# Patient Record
Sex: MALE | Race: AMERICAN INDIAN OR ALASKA NATIVE
[De-identification: names, ages, dates, MRNs, and addresses within clinical notes are randomized per-mention and may not be internally consistent; named-entity substitution may affect disease eponyms.]

---

## 2017-03-02 ENCOUNTER — HOSPITAL ENCOUNTER (EMERGENCY)
Dept: HOSPITAL 5 - ED | Age: 62
Discharge: HOME | End: 2017-03-02
Payer: SELF-PAY

## 2017-03-02 VITALS — SYSTOLIC BLOOD PRESSURE: 171 MMHG | DIASTOLIC BLOOD PRESSURE: 94 MMHG

## 2017-03-02 DIAGNOSIS — R13.10: Primary | ICD-10-CM

## 2017-03-02 DIAGNOSIS — F17.200: ICD-10-CM

## 2017-03-02 DIAGNOSIS — I10: ICD-10-CM

## 2017-03-02 LAB
ALBUMIN SERPL-MCNC: 3.8 G/DL (ref 3.9–5)
ALBUMIN/GLOB SERPL: 0.8 %
ALP SERPL-CCNC: 99 UNITS/L (ref 35–129)
ALT SERPL-CCNC: 10 UNITS/L (ref 7–56)
ANION GAP SERPL CALC-SCNC: 16 MMOL/L
BASOPHILS NFR BLD AUTO: 0.4 % (ref 0–1.8)
BILIRUB SERPL-MCNC: 0.3 MG/DL (ref 0.1–1.2)
BUN SERPL-MCNC: 18 MG/DL (ref 9–20)
BUN/CREAT SERPL: 15 %
CALCIUM SERPL-MCNC: 9.5 MG/DL (ref 8.4–10.2)
CHLORIDE SERPL-SCNC: 99.2 MMOL/L (ref 98–107)
CO2 SERPL-SCNC: 28 MMOL/L (ref 22–30)
EOSINOPHIL NFR BLD AUTO: 1.4 % (ref 0–4.3)
GLUCOSE SERPL-MCNC: 79 MG/DL (ref 75–100)
HCT VFR BLD CALC: 41.3 % (ref 35.5–45.6)
HGB BLD-MCNC: 13.7 GM/DL (ref 11.8–15.2)
LIPASE SERPL-CCNC: 34 UNITS/L (ref 13–60)
MCH RBC QN AUTO: 29 PG (ref 28–32)
MCHC RBC AUTO-ENTMCNC: 33 % (ref 32–34)
MCV RBC AUTO: 88 FL (ref 84–94)
PLATELET # BLD: 232 K/MM3 (ref 140–440)
POTASSIUM SERPL-SCNC: 4.9 MMOL/L (ref 3.6–5)
PROT SERPL-MCNC: 8.3 G/DL (ref 6.3–8.2)
RBC # BLD AUTO: 4.71 M/MM3 (ref 3.65–5.03)
SODIUM SERPL-SCNC: 138 MMOL/L (ref 137–145)
WBC # BLD AUTO: 9 K/MM3 (ref 4.5–11)

## 2017-03-02 PROCEDURE — 99283 EMERGENCY DEPT VISIT LOW MDM: CPT

## 2017-03-02 PROCEDURE — 80053 COMPREHEN METABOLIC PANEL: CPT

## 2017-03-02 PROCEDURE — 36415 COLL VENOUS BLD VENIPUNCTURE: CPT

## 2017-03-02 PROCEDURE — 83690 ASSAY OF LIPASE: CPT

## 2017-03-02 PROCEDURE — 85025 COMPLETE CBC W/AUTO DIFF WBC: CPT

## 2017-03-02 NOTE — EMERGENCY DEPARTMENT REPORT
HPI





- General


Chief Complaint: Abdominal Pain


Time Seen by Provider: 03/02/17 11:43





- HPI


HPI: 





 


Chief complaint: Difficulty swallowing


HPI: Patient is a 61-year-old male with a history of GERD and hypertension who 

states for the last several weeks is unable to eat anything solid without 

chewing it into very small pieces.  Patient states that he doesn't do that it 

comes back up.  Patient states if he drinks too quickly even that woke him back 

up.  Patient denies any pain.  Patient is out of his blood pressure medicine 

and states he was going to [facility] today to have that refilled.  She 

denies any fever, chest pain, diarrhea, abdominal pain.


Mode of arrival:   [private car]


Source: [Patient]


Began: Several weeks ago


Duration: 2 weeks ago


Context: Patient has history of GERD


Quality: Pain-free


Severity: 0 out of 10 


Improved with: See above


Worsened with: See above


Associated signs and symptoms: See above





ED Past Medical Hx





- Past Medical History


Previous Medical History?: Yes


Hx Hypertension: Yes


Additional medical history: Angioedema





- Surgical History


Past Surgical History?: No





- Social History


Smoking Status: Current Every Day Smoker


Substance Use Type: None





- Medications


Home Medications: 


 Home Medications











 Medication  Instructions  Recorded  Confirmed  Last Taken  Type


 


Pantoprazole [Protonix] 40 mg PO QDAY #30 tablet 06/13/16  Unknown Rx


 


Hydrochlorothiazide [HCTZ] 25 mg PO QDAY #30 tablet 03/02/17  Unknown Rx














ED Review of Systems


ROS: 


Stated complaint: ABD PAIN


Other details as noted in HPI


ROS





Constitutional: No fever 


ENT: No uri symptoms


Cardiovascular: No chest pain


Respiratory: No sob or cough


GI: No nausea vomiting or diarrhea


: No dysuria frequency or urgency, 


Skin: No rash


Neuro: No focal weakness or numbness


Psych: No depression


Hema/lymph: No edema





Physical Exam





- Physical Exam


Vital Signs: 


 Vital Signs











  03/02/17





  08:43


 


Temperature 98.0 F


 


Pulse Rate 72


 


Respiratory 18





Rate 


 


Blood Pressure 171/104


 


O2 Sat by Pulse 98





Oximetry 











Physical Exam: 





GENERAL: The patient is well-developed well-nourished []. []


HEENT: Normocephalic.  Atraumatic.  Extraocular motions are intact.  Patient 

has moist mucous membranes.


NECK: Supple.  No meningitic signs are noted.  There is no adenopathy noted.


CHEST/LUNGS: Clear to auscultation.  There is no respiratory distress noted.


HEART/CARDIOVASCULAR: Regular.  There is no tachycardia.  There is no gallop 

rub or murmur.


ABDOMEN: Abdomen is soft, nontender.  Patient has normal bowel sounds.  There 

is no abdominal distention.


SKIN: There is no rash.  There is no edema.  There is no diaphoresis.


NEURO: The patient is awake, alert, and oriented.  The patient is cooperative.  

The patient has no focal neurologic deficits.  The patient has normal speech.


MUSCULOSKELETAL: There is no tenderness or deformity.  There is no limitation 

range of motion.  There is no evidence of acute injury.





ED Course


 Vital Signs











  03/02/17





  08:43


 


Temperature 98.0 F


 


Pulse Rate 72


 


Respiratory 18





Rate 


 


Blood Pressure 171/104


 


O2 Sat by Pulse 98





Oximetry 














- Reevaluation(s)


Reevaluation #1: 





03/02/17 12:09


Discussed patient with nurse practitioner for [facility] Trinidad Posey.  She will see the patient in the emergency department and arrange for 

an outpatient upper endoscopy.





ED Medical Decision Making





- Lab Data


Result diagrams: 


 03/02/17 08:55





 03/02/17 08:55





 Laboratory Tests











  03/02/17





  08:55


 


Total Protein  8.3 H


 


Albumin  3.8 L


 


Lipase  34











Critical care attestation.: 


If time is entered above; I have spent that time in minutes in the direct care 

of this critically ill patient, excluding procedure time.








ED Disposition


Clinical Impression: 


 Essential hypertension





Dysphagia


Qualifiers:


 Dysphagia type: unspecified Qualified Code(s): R13.10 - Dysphagia, unspecified





Disposition: DISCHARGED TO HOME OR SELFCARE


Is pt being admited?: No


Does the pt Need Aspirin: No


Condition: Stable


Instructions:  Esophageal Stricture (ED), Hypertension (ED)


Additional Instructions: 


Take Pepcid complete OTC daily.  Be sure to chew the tablet well.


Prescriptions: 


Hydrochlorothiazide [HCTZ] 25 mg PO QDAY #30 tablet


Referrals: 


PRIMARY CARE,MD [Primary Care Provider] - 3-5 Days


[facility] [Provider Group] - 3-5 Days


Time of Disposition: 12:04

## 2017-12-12 ENCOUNTER — HOSPITAL ENCOUNTER (EMERGENCY)
Dept: HOSPITAL 5 - ED | Age: 62
Discharge: LEFT BEFORE DISCHARGE | End: 2017-12-12
Payer: MEDICAID

## 2017-12-12 VITALS — SYSTOLIC BLOOD PRESSURE: 170 MMHG | DIASTOLIC BLOOD PRESSURE: 109 MMHG

## 2017-12-12 DIAGNOSIS — Z53.21: ICD-10-CM

## 2017-12-12 DIAGNOSIS — M54.5: Primary | ICD-10-CM

## 2017-12-12 DIAGNOSIS — R10.9: ICD-10-CM

## 2017-12-12 DIAGNOSIS — R11.0: ICD-10-CM

## 2017-12-12 LAB
ALBUMIN SERPL-MCNC: 3.8 G/DL (ref 3.9–5)
ALBUMIN/GLOB SERPL: 0.8 %
ALP SERPL-CCNC: 89 UNITS/L (ref 35–129)
ALT SERPL-CCNC: 13 UNITS/L (ref 7–56)
ANION GAP SERPL CALC-SCNC: 18 MMOL/L
BASOPHILS NFR BLD AUTO: 0.5 % (ref 0–1.8)
BILIRUB SERPL-MCNC: 0.4 MG/DL (ref 0.1–1.2)
BUN SERPL-MCNC: 11 MG/DL (ref 9–20)
BUN/CREAT SERPL: 11 %
CALCIUM SERPL-MCNC: 9.5 MG/DL (ref 8.4–10.2)
CHLORIDE SERPL-SCNC: 97.5 MMOL/L (ref 98–107)
CO2 SERPL-SCNC: 28 MMOL/L (ref 22–30)
EOSINOPHIL NFR BLD AUTO: 1.2 % (ref 0–4.3)
GLUCOSE SERPL-MCNC: 96 MG/DL (ref 75–100)
HCT VFR BLD CALC: 35.2 % (ref 35.5–45.6)
HGB BLD-MCNC: 12 GM/DL (ref 11.8–15.2)
LIPASE SERPL-CCNC: 13 UNITS/L (ref 13–60)
MCH RBC QN AUTO: 32 PG (ref 28–32)
MCHC RBC AUTO-ENTMCNC: 34 % (ref 32–34)
MCV RBC AUTO: 93 FL (ref 84–94)
PLATELET # BLD: 279 K/MM3 (ref 140–440)
POTASSIUM SERPL-SCNC: 4.9 MMOL/L (ref 3.6–5)
PROT SERPL-MCNC: 8.4 G/DL (ref 6.3–8.2)
RBC # BLD AUTO: 3.79 M/MM3 (ref 3.65–5.03)
SODIUM SERPL-SCNC: 139 MMOL/L (ref 137–145)
WBC # BLD AUTO: 5.5 K/MM3 (ref 4.5–11)

## 2017-12-12 PROCEDURE — 85025 COMPLETE CBC W/AUTO DIFF WBC: CPT

## 2017-12-12 PROCEDURE — 83690 ASSAY OF LIPASE: CPT

## 2017-12-12 PROCEDURE — 36415 COLL VENOUS BLD VENIPUNCTURE: CPT

## 2017-12-12 PROCEDURE — 80053 COMPREHEN METABOLIC PANEL: CPT

## 2018-03-04 ENCOUNTER — HOSPITAL ENCOUNTER (EMERGENCY)
Dept: HOSPITAL 5 - ED | Age: 63
Discharge: HOME | End: 2018-03-04
Payer: SELF-PAY

## 2018-03-04 VITALS — SYSTOLIC BLOOD PRESSURE: 160 MMHG | DIASTOLIC BLOOD PRESSURE: 91 MMHG

## 2018-03-04 DIAGNOSIS — B02.29: ICD-10-CM

## 2018-03-04 DIAGNOSIS — I10: ICD-10-CM

## 2018-03-04 DIAGNOSIS — B02.9: Primary | ICD-10-CM

## 2018-03-04 DIAGNOSIS — Z87.891: ICD-10-CM

## 2018-03-04 PROCEDURE — 99282 EMERGENCY DEPT VISIT SF MDM: CPT

## 2018-03-04 NOTE — EMERGENCY DEPARTMENT REPORT
ED Rash HPI





- HPI


Chief Complaint: Skin Rash


Stated Complaint: BACK PAIN


Time Seen by Provider: 03/04/18 15:11


Duration: 4 months


Location: Back (left flank and left back)


Suspected Cause: Unknown


Rash Symptoms: Yes Blistering, No Itching, No Facial Swelling, No Tongue/Oral 

Swelling, No Breathing Difficulties, No Choking Sensation, No Wheezing/Dyspnea, 

No Peeling, No Fever, No Lightheaded, No Malaise, No Myalgias


Severity: severe


Other History: This is a 62 y.o. male that presents with a painful rash to left 

flank traveling to left mid back for 4 months on and off. The rash is starting 

to heal and crusting but pain is still 10/10. He is currently followed by [facility] 

oncology and primary care provider Dr. Singh. PCP gave him tylenol 3 for pain 

control, which is taking but unable to sleep or sit in a chair because the pain 

is unbearable. Denies swelling, itching, fever, or discharge.





ED Review of Systems


ROS: 


Stated complaint: BACK PAIN


Other details as noted in HPI





Constitutional: denies: chills, fever


Respiratory: denies: cough, shortness of breath, wheezing


Cardiovascular: denies: chest pain, palpitations


Gastrointestinal: denies: abdominal pain, nausea, diarrhea


Musculoskeletal: denies: back pain, joint swelling, arthralgia, myalgia


Skin: rash (painful rash to left flank to left back).  denies: lesions





ED Past Medical Hx





- Past Medical History


Hx Hypertension: Yes


Additional medical history: Angioedema,shingles





- Surgical History


Additional Surgical History: esophagus cancer oct 25 2017, chemo and radiation





- Social History


Smoking Status: Former Smoker


Substance Use Type: None





- Medications


Home Medications: 


 Home Medications











 Medication  Instructions  Recorded  Confirmed  Last Taken  Type


 


Pantoprazole [Protonix] 40 mg PO QDAY #30 tablet 06/13/16  Unknown Rx


 


Hydrochlorothiazide [HCTZ] 25 mg PO QDAY #30 tablet 03/02/17  Unknown Rx


 


Capsaicin [Capzasin-Hp] 1 applicatio TP TID PRN #1 cream 03/04/18  Unknown Rx


 


Valacyclovir HCl [Valtrex] 1,000 mg PO TID #60 tablet 03/04/18  Unknown Rx


 


traMADol [Ultram 50 MG tab] 50 mg PO Q6HR PRN #30 tablet 03/04/18  Unknown Rx














Rash Exam





- Exam


General: 


Vital signs noted. No distress. Alert and acting appropriately.





HEENT: No Periorbital Edema, No Conjuctival Injection, No Chemosis, No Perioral 

Edema, No Tongue Edema, No Uvular Edema, No Compromised Airway, No Drooling


Lungs: Yes Good Air Exchange, No Wheezes, No Ronchi, No Stridor, No Cough, No 

Labored Respirations, No Retractions, No Use of Accessory Muscles, No Other 

Abnormal Lung Sounds


Heart: Yes Regular, No Murmur


Skin: Yes Maculopapular Rash (healing maculopapular rash following dermatone on 

left flank following to left mid back, tender), Yes Encrustations, No 

Urticarial Rash, No Morbilliform rash, No Bulla(e), No Excoriations, No Weeping

, No Tenderness, No Erythema, No Edema, No Other





ED Course


 Vital Signs











  03/04/18





  15:05


 


Temperature 98 F


 


Pulse Rate 109 H


 


Respiratory 20





Rate 


 


Blood Pressure 168/100


 


O2 Sat by Pulse 100





Oximetry 














ED Medical Decision Making





- Medical Decision Making





This is a 62 y.o. male with painful rash from shingles for 4 months on and off. 

He is currently followed by oncology at [facility] and PCP Dr. Singh. He is taking 

tylenol 3 for pain with no improvement of pain. Patient examined by me. Mildly 

distress noted. Vitals stable. Physical assessment susceptible of postviral 

herpatic neurologa.  Patient given valtrex 1 gram po once in ER. Start 

valacyclovir 1,000 mg po tid x 20 days, tramadol 50 mg po q6h PRN #20, and 

capsaicin. Discussed plan with patient and educated on management of postviral 

herpatic neurologa. Informed symptoms may last for 4-6 months.





Critical care attestation.: 


If time is entered above; I have spent that time in minutes in the direct care 

of this critically ill patient, excluding procedure time.








ED Disposition


Clinical Impression: 


 Postherpetic neuralgia





Herpes zoster


Qualifiers:


 Herpes zoster complications: without complications Qualified Code(s): B02.9 - 

Zoster without complications





Disposition: DC-01 TO HOME OR SELFCARE


Is pt being admited?: No


Does the pt Need Aspirin: No


Condition: Stable


Instructions:  Herpes Zoster (ED)


Additional Instructions: 


Keep the rash clean and dry to reduce the risk of bacterial infection. 





Avoid topical antibiotics or dressings with adhesive that may cause irritation 

and may delay healing of the rash. 





Avoid wearing irritating natural fiber clothing. 





You are having postherpetic pain and may need to focus attention on something 

other than pain.





Try using capsacin cream to help control pain.


Prescriptions: 


Capsaicin [Capzasin-Hp] 1 applicatio TP TID PRN #1 cream


 PRN Reason: Pain


traMADol [Ultram 50 MG tab] 50 mg PO Q6HR PRN #30 tablet


 PRN Reason: Pain


Valacyclovir HCl [Valtrex] 1,000 mg PO TID #60 tablet


Referrals: 


[facility] [Outside] - 3-5 Days


[facility] [Outside] - 3-5 Days


[facility] [Outside] - 3-5 Days


Time of Disposition: 16:14


Print Language: ENGLISH

## 2018-03-18 ENCOUNTER — HOSPITAL ENCOUNTER (EMERGENCY)
Dept: HOSPITAL 5 - ED | Age: 63
LOS: 1 days | Discharge: HOME | End: 2018-03-19
Payer: SELF-PAY

## 2018-03-18 DIAGNOSIS — Z87.891: ICD-10-CM

## 2018-03-18 DIAGNOSIS — I10: ICD-10-CM

## 2018-03-18 DIAGNOSIS — I82.493: Primary | ICD-10-CM

## 2018-03-18 DIAGNOSIS — Z88.8: ICD-10-CM

## 2018-03-18 DIAGNOSIS — C15.9: ICD-10-CM

## 2018-03-18 DIAGNOSIS — M10.9: ICD-10-CM

## 2018-03-18 LAB
ALBUMIN SERPL-MCNC: 3 G/DL (ref 3.9–5)
ALBUMIN SERPL-MCNC: 3.4 G/DL (ref 3.9–5)
ALT SERPL-CCNC: 16 UNITS/L (ref 7–56)
ALT SERPL-CCNC: 17 UNITS/L (ref 7–56)
APTT BLD: 23.8 SEC. (ref 24.2–36.6)
APTT BLD: 25.2 SEC. (ref 24.2–36.6)
BASOPHILS # (AUTO): 0 K/MM3 (ref 0–0.1)
BASOPHILS # (AUTO): 0.1 K/MM3 (ref 0–0.1)
BASOPHILS NFR BLD AUTO: 0.4 % (ref 0–1.8)
BASOPHILS NFR BLD AUTO: 0.6 % (ref 0–1.8)
BUN SERPL-MCNC: 11 MG/DL (ref 9–20)
BUN SERPL-MCNC: 12 MG/DL (ref 9–20)
BUN/CREAT SERPL: 13 %
BUN/CREAT SERPL: 9 %
CALCIUM SERPL-MCNC: 8.7 MG/DL (ref 8.4–10.2)
CALCIUM SERPL-MCNC: 9 MG/DL (ref 8.4–10.2)
EOSINOPHIL # BLD AUTO: 0 K/MM3 (ref 0–0.4)
EOSINOPHIL # BLD AUTO: 0 K/MM3 (ref 0–0.4)
EOSINOPHIL NFR BLD AUTO: 0.2 % (ref 0–4.3)
EOSINOPHIL NFR BLD AUTO: 0.3 % (ref 0–4.3)
HCT VFR BLD CALC: 35.7 % (ref 35.5–45.6)
HCT VFR BLD CALC: 35.9 % (ref 35.5–45.6)
HEMOLYSIS INDEX: 24
HEMOLYSIS INDEX: 3
HGB BLD-MCNC: 11.7 GM/DL (ref 11.8–15.2)
HGB BLD-MCNC: 11.9 GM/DL (ref 11.8–15.2)
INR PPP: 0.88 (ref 0.87–1.13)
INR PPP: 0.92 (ref 0.87–1.13)
LIPASE SERPL-CCNC: 13 UNITS/L (ref 13–60)
LYMPHOCYTES # BLD AUTO: 0.5 K/MM3 (ref 1.2–5.4)
LYMPHOCYTES # BLD AUTO: 0.8 K/MM3 (ref 1.2–5.4)
LYMPHOCYTES NFR BLD AUTO: 6.2 % (ref 13.4–35)
LYMPHOCYTES NFR BLD AUTO: 7.9 % (ref 13.4–35)
MCH RBC QN AUTO: 31 PG (ref 28–32)
MCH RBC QN AUTO: 32 PG (ref 28–32)
MCHC RBC AUTO-ENTMCNC: 33 % (ref 32–34)
MCHC RBC AUTO-ENTMCNC: 33 % (ref 32–34)
MCV RBC AUTO: 95 FL (ref 84–94)
MCV RBC AUTO: 95 FL (ref 84–94)
MONOCYTES # (AUTO): 1 K/MM3 (ref 0–0.8)
MONOCYTES # (AUTO): 1.1 K/MM3 (ref 0–0.8)
MONOCYTES % (AUTO): 10.8 % (ref 0–7.3)
MONOCYTES % (AUTO): 10.9 % (ref 0–7.3)
PLATELET # BLD: 190 K/MM3 (ref 140–440)
PLATELET # BLD: 218 K/MM3 (ref 140–440)
RBC # BLD AUTO: 3.76 M/MM3 (ref 3.65–5.03)
RBC # BLD AUTO: 3.78 M/MM3 (ref 3.65–5.03)

## 2018-03-18 PROCEDURE — 93970 EXTREMITY STUDY: CPT

## 2018-03-18 PROCEDURE — 85730 THROMBOPLASTIN TIME PARTIAL: CPT

## 2018-03-18 PROCEDURE — 85025 COMPLETE CBC W/AUTO DIFF WBC: CPT

## 2018-03-18 PROCEDURE — 36415 COLL VENOUS BLD VENIPUNCTURE: CPT

## 2018-03-18 PROCEDURE — 96365 THER/PROPH/DIAG IV INF INIT: CPT

## 2018-03-18 PROCEDURE — 80053 COMPREHEN METABOLIC PANEL: CPT

## 2018-03-18 PROCEDURE — 83690 ASSAY OF LIPASE: CPT

## 2018-03-18 PROCEDURE — 99283 EMERGENCY DEPT VISIT LOW MDM: CPT

## 2018-03-18 PROCEDURE — 85610 PROTHROMBIN TIME: CPT

## 2018-03-18 PROCEDURE — 84550 ASSAY OF BLOOD/URIC ACID: CPT

## 2018-03-18 PROCEDURE — 96375 TX/PRO/DX INJ NEW DRUG ADDON: CPT

## 2018-03-18 NOTE — EMERGENCY DEPARTMENT REPORT
<MEGANSHENA - Last Filed: 03/18/18 19:59>





ED Extremity Problem HPI





- General


Chief complaint: Extremity Problem,Nontraumatic


Stated complaint: BILATERAL ANKLE PAIN


Time Seen by Provider: 03/18/18 17:57


Source: patient, family


Mode of arrival: Wheelchair


Limitations: No Limitations





- History of Present Illness


Initial comments: 





Patient complains of bilateral foot and ankle pain for 2 days.  He denies any 

chest pain, redness of breath, abdominal pain, headache or nausea or vomiting.


MD Complaint: extremity pain, extremity swelling


-: days(s) (2)


Location: bilateral lower extremity


History of Same: Yes


-: No fever, No associated chest pain


Radiation: none


Severity scale (0 -10): 6


Consistency: constant


Improves with: nothing


Worsens with: nothing


Associated Symptoms: denies other symptoms.  denies: shortness of breath





- Related Data


 Previous Rx's











 Medication  Instructions  Recorded  Last Taken  Type


 


Pantoprazole [Protonix] 40 mg PO QDAY #30 tablet 06/13/16 Unknown Rx


 


Hydrochlorothiazide [HCTZ] 25 mg PO QDAY #30 tablet 03/02/17 Unknown Rx


 


Capsaicin [Capzasin-Hp] 1 applicatio TP TID PRN #1 cream 03/04/18 Unknown Rx


 


Valacyclovir HCl [Valtrex] 1,000 mg PO TID #60 tablet 03/04/18 Unknown Rx


 


traMADol [Ultram 50 MG tab] 50 mg PO Q6HR PRN #30 tablet 03/04/18 Unknown Rx











 Allergies











Allergy/AdvReac Type Severity Reaction Status Date / Time


 


blood pressure medication Allergy  Angioedema Uncoded 03/02/17 08:50














ED Review of Systems


ROS: 


Stated complaint: BILATERAL ANKLE PAIN


Other details as noted in HPI





Comment: All other systems reviewed and negative


Constitutional: denies: chills, diaphoresis, fever


Eyes: denies: eye discharge


ENT: denies: dental pain


Respiratory: denies: shortness of breath


Cardiovascular: denies: chest pain, palpitations


Endocrine: no symptoms reported


Gastrointestinal: denies: abdominal pain, nausea, vomiting, diarrhea, 

hematemesis


Genitourinary: denies: urgency, dysuria, frequency


Musculoskeletal: joint swelling, arthralgia


Skin: denies: lesions, change in color


Neurological: denies: headache, numbness, paresthesias


Psychiatric: denies: auditory hallucinations, homicidal thoughts, suicidal 

thoughts


Hematological/Lymphatic: denies: easy bleeding, easy bruising





ED Past Medical Hx





- Past Medical History


Previous Medical History?: Yes


Hx Hypertension: Yes


Hx of Cancer: Yes (esophagus)


Additional medical history: Angioedema,shingles





- Surgical History


Past Surgical History?: Yes


Additional Surgical History: esophagus cancer oct 25 2017, chemo and radiation





- Social History


Smoking Status: Former Smoker


Substance Use Type: Prescribed





- Medications


Home Medications: 


 Home Medications











 Medication  Instructions  Recorded  Confirmed  Last Taken  Type


 


Pantoprazole [Protonix] 40 mg PO QDAY #30 tablet 06/13/16  Unknown Rx


 


Hydrochlorothiazide [HCTZ] 25 mg PO QDAY #30 tablet 03/02/17  Unknown Rx


 


Capsaicin [Capzasin-Hp] 1 applicatio TP TID PRN #1 cream 03/04/18  Unknown Rx


 


Valacyclovir HCl [Valtrex] 1,000 mg PO TID #60 tablet 03/04/18  Unknown Rx


 


traMADol [Ultram 50 MG tab] 50 mg PO Q6HR PRN #30 tablet 03/04/18  Unknown Rx














ED Physical Exam





- General


Limitations: No Limitations


General appearance: alert





- Head


Head exam: Present: atraumatic, normocephalic, normal inspection





- Eye


Eye exam: Present: normal appearance, PERRL, EOMI





- ENT


ENT exam: Present: normal exam, normal orophraynx, mucous membranes moist





- Neck


Neck exam: Present: normal inspection.  Absent: tenderness





- Respiratory


Respiratory exam: Present: normal lung sounds bilaterally





- Cardiovascular


Cardiovascular Exam: Present: regular rate, normal rhythm





- GI/Abdominal


GI/Abdominal exam: Present: soft, normal bowel sounds.  Absent: distended, 

tenderness, guarding, rebound





- Extremities Exam


Extremities exam: Present: tenderness, normal capillary refill, pedal edema, 

joint swelling.  Absent: calf tenderness





- Back Exam


Back exam: Present: normal inspection





- Neurological Exam


Neurological exam: Present: alert, oriented X3, CN II-XII intact





- Psychiatric


Psychiatric exam: Present: normal affect, normal mood





- Skin


Skin exam: Present: warm, dry, intact





ED Course





 Vital Signs











  03/18/18 03/18/18 03/18/18





  11:24 16:02 18:06


 


Temperature 98.6 F 97.9 F 98.0 F


 


Pulse Rate 91 H 85 85


 


Respiratory 16 18 21





Rate   


 


Blood Pressure 155/92 151/107 


 


Blood Pressure   196/117





[Left]   


 


O2 Sat by Pulse 100 99 100





Oximetry   














  03/18/18 03/18/18 03/18/18





  18:10 19:26 19:31


 


Temperature   


 


Pulse Rate   96 H


 


Respiratory 21 28 H 26 H





Rate   


 


Blood Pressure   


 


Blood Pressure   





[Left]   


 


O2 Sat by Pulse 100 99 98





Oximetry   














  03/18/18 03/18/18 03/18/18





  19:45 20:01 20:15


 


Temperature   


 


Pulse Rate 101 H 103 H 89


 


Respiratory 26 H 24 23





Rate   


 


Blood Pressure   


 


Blood Pressure   





[Left]   


 


O2 Sat by Pulse 95 95 100





Oximetry   














  03/18/18 03/18/18 03/18/18





  20:31 20:40 20:45


 


Temperature   


 


Pulse Rate 87  96 H


 


Respiratory 18  19





Rate   


 


Blood Pressure   115/84


 


Blood Pressure  144/91 





[Left]   


 


O2 Sat by Pulse 98  97





Oximetry   














  03/18/18 03/18/18 03/18/18





  21:00 21:15 21:30


 


Temperature   


 


Pulse Rate 87 89 89


 


Respiratory 22 22 18





Rate   


 


Blood Pressure 123/79 130/83 114/76


 


Blood Pressure   





[Left]   


 


O2 Sat by Pulse 97 96 96





Oximetry   














  03/18/18 03/18/18 03/18/18





  21:45 22:00 22:15


 


Temperature   


 


Pulse Rate 86 91 H 89


 


Respiratory 20 26 H 23





Rate   


 


Blood Pressure 121/78  128/82


 


Blood Pressure   





[Left]   


 


O2 Sat by Pulse  96 96





Oximetry   














  03/18/18 03/18/18 03/18/18





  22:24 22:30 22:45


 


Temperature   


 


Pulse Rate 88 86 87


 


Respiratory 19 19 19





Rate   


 


Blood Pressure 128/82 117/80 124/85


 


Blood Pressure   





[Left]   


 


O2 Sat by Pulse 96  





Oximetry   














  03/18/18 03/18/18 03/18/18





  23:00 23:15 23:30


 


Temperature   


 


Pulse Rate 88 84 81


 


Respiratory 23 20 23





Rate   


 


Blood Pressure 137/84 130/83 138/84


 


Blood Pressure   





[Left]   


 


O2 Sat by Pulse 95  





Oximetry   














  03/18/18 03/19/18 03/19/18





  23:45 00:00 00:15


 


Temperature   


 


Pulse Rate 85 86 85


 


Respiratory 21 19 19





Rate   


 


Blood Pressure 138/88 139/90 143/96


 


Blood Pressure   





[Left]   


 


O2 Sat by Pulse 96  97





Oximetry   














  03/19/18 03/19/18 03/19/18





  00:30 00:45 01:00


 


Temperature   


 


Pulse Rate 81 82 82


 


Respiratory 17 14 18





Rate   


 


Blood Pressure 129/87 126/86 126/89


 


Blood Pressure   





[Left]   


 


O2 Sat by Pulse   





Oximetry   














  03/19/18





  01:15


 


Temperature 


 


Pulse Rate 83


 


Respiratory 19





Rate 


 


Blood Pressure 130/87


 


Blood Pressure 





[Left] 


 


O2 Sat by Pulse 





Oximetry 














- Reevaluation(s)


Reevaluation #1: 





03/18/18 20:10


I discussed patient with Dr. Adames at shift change.  Patient care was 

transferred to Dr. Sepulveda for further management in the ED, reevaluation and 

disposition.


Patient is medically stable at the point of transfer of care.





ED Medical Decision Making





- Lab Data


Result diagrams: 


 03/18/18 19:04





 03/18/18 19:04





- Medical Decision Making





Gouty arthritis.


DVT


Uncontrolled Hypertension.


Critical care attestation.: 


If time is entered above; I have spent that time in minutes in the direct care 

of this critically ill patient, excluding procedure time.








ED Disposition


Clinical Impression: 


 DVT (deep venous thrombosis)





Disposition: DC-01 TO HOME OR SELFCARE


Does the pt Need Aspirin: No


Condition: Stable


Instructions:  Hypertension (ED), Deep Venous Thrombosis (ED)


Additional Instructions: 


Continue your eliquis Tylenol over-the-counter as needed as directed see her 

regular doctor return if worse


Referrals: 


RD QUEVEDO JR, MD [Primary Care Provider] - 3-5 Days





<SEAN SEPULVEDA - Last Filed: 03/19/18 02:46>





ED Medical Decision Making





- Lab Data


Result diagrams: 


 03/18/18 19:04





 03/18/18 19:04





ED Disposition


Is pt being admited?: No


Time of Disposition: 02:46

## 2018-03-19 VITALS — DIASTOLIC BLOOD PRESSURE: 92 MMHG | SYSTOLIC BLOOD PRESSURE: 140 MMHG

## 2020-12-31 NOTE — VASCULAR LAB REPORT
LOWER EXTREMITY VENOUS DUPLEX:



REASON FOR EXAM: History of deep venous thrombosis.



COMMENTS ON THE RIGHT:

Acute deep venous thrombosis is seen involving the popliteal vein and 

the peroneal vein.  The remaining veins visualized are freely 

compressible without evidence of internal echogenicity.  Spontaneous 

and phasic flow is present proximally.



COMMENTS ON THE LEFT:

Acute deep venous thrombosis is seen in the common femoral vein, 

superficial femoral vein, popliteal vein and the peroneal vein.  The 

remaining veins visualized are freely compressible without evidence of 

internal echogenicity.  Spontaneous and phasic flow is absent 

proximally.



IMPRESSION:

Extensive acute deep venous thrombosis in the left lower extremity.

Acute deep venous thrombosis in the right lower extremity.
same

## 2022-01-04 ENCOUNTER — HOSPITAL ENCOUNTER (EMERGENCY)
Dept: HOSPITAL 5 - ED | Age: 67
End: 2022-01-04
Payer: SELF-PAY

## 2022-01-04 DIAGNOSIS — Z87.891: ICD-10-CM

## 2022-01-04 DIAGNOSIS — I46.9: Primary | ICD-10-CM

## 2022-01-04 PROCEDURE — 92950 HEART/LUNG RESUSCITATION CPR: CPT

## 2022-01-04 PROCEDURE — 99285 EMERGENCY DEPT VISIT HI MDM: CPT

## 2022-01-04 NOTE — EMERGENCY DEPARTMENT REPORT
ED CPR HPI





- General


Chief Complaint: Cardiac Arrest/CPR


Stated Complaint: CARDIAC ARREST


Time Seen by Provider: 22 04:01


Source: EMS, old records reviewed


Mode of arrival: Stretcher


Limitations: Other





- History of Present Illness


Initial Comments: 





Chief complaint cardiac arrest





HPI: Is a 66-year-old male with a history of hypertension, gout herpes zoster 

who was discovered unresponsive person in the bathroom.  Son heard his father 

vomiting.  He heard a fall.  Son performed CPR.  Call came out at 3 AM.  EMS 

arrived 311.  Asystole initial rhythm.  Chirag airway inserted.  Patient received 

4 doses of epinephrine, 1 dose of naloxone.  Accu-Chek 90.  Initial rhythm 

asystole developed into a PEA


MD Complaint: found unresponsive


Place: home


Bystander CPR Performed: Yes


Shock Advised: No


Initial Findings in the Field: unresponsive


ROSC in the Field: No


Associated Injuries: No


Treatments Prior to Arrival: other airway device (Chirag airway), chest 

compressions (Chest comparison), epinephrine mgs # (4 doses), other (1 dose of 

naloxone)





- Related Data


                                  Previous Rx's











 Medication  Instructions  Recorded  Last Taken  Type


 


Pantoprazole [Protonix] 40 mg PO QDAY #30 tablet 16 Unknown Rx


 


hydroCHLOROthiazide [HCTZ] 25 mg PO QDAY #30 tablet 17 Unknown Rx


 


Capsaicin [Capzasin-Hp] 1 applicatio TP TID PRN #1 cream 18 Unknown Rx


 


Valacyclovir HCl [Valtrex] 1,000 mg PO TID #60 tablet 18 Unknown Rx


 


traMADoL [Ultram 50 MG tab] 50 mg PO Q6HR PRN #30 tablet 18 Unknown Rx











                                    Allergies











Allergy/AdvReac Type Severity Reaction Status Date / Time


 


blood pressure medication Allergy  Angioedema Uncoded 17 08:50














ED Review of Systems


ROS: 


Stated complaint: CARDIAC ARREST


Other details as noted in HPI





Comment: Unobtainable due to pts medical conditions





ED Past Medical Hx





- Past Medical History


Previous Medical History?: Yes


Hx Hypertension: Yes


Additional medical history: Angioedema,shingles





- Surgical History


Past Surgical History?: Yes


Additional Surgical History: esophagus cancer oct 25 2017, chemo and radiation





- Social History


Smoking Status: Former Smoker


Substance Use Type: Prescribed





- Medications


Home Medications: 


                                Home Medications











 Medication  Instructions  Recorded  Confirmed  Last Taken  Type


 


Pantoprazole [Protonix] 40 mg PO QDAY #30 tablet 16  Unknown Rx


 


hydroCHLOROthiazide [HCTZ] 25 mg PO QDAY #30 tablet 17  Unknown Rx


 


Capsaicin [Capzasin-Hp] 1 applicatio TP TID PRN #1 cream 18  Unknown Rx


 


Valacyclovir HCl [Valtrex] 1,000 mg PO TID #60 tablet 18  Unknown Rx


 


traMADoL [Ultram 50 MG tab] 50 mg PO Q6HR PRN #30 tablet 18  Unknown Rx














ED Physical Exam





- General


Limitations: Other


General appearance: other (Lifeless no spontaneous movement)





- Head


Head exam: Present: atraumatic, normocephalic





- Eye


Eye exam: Present: other (Fixed dilated pupils dry corneas)





- ENT


ENT exam: Present: other (Chirag airway device in place pale mucous membrane)





- Neck


Neck exam: Present: normal inspection, full ROM, other (JVD significant supine)





- Respiratory


Respiratory exam: Present: other (Equal chest rise.  Ventilation no spontaneous 

respiration)





- Cardiovascular


Cardiovascular Exam: Present: other (No palpable pulse, auscultated cardiac 

sounds)





- GI/Abdominal


GI/Abdominal exam: Present: distended





- Extremities Exam


Extremities exam: Present: normal inspection, other (No deformity no erythema)





- Neurological Exam


Neurological exam: Present: other (No signs of life no spontaneous movement)





- Psychiatric


Psychiatric exam: Present: other (No signs of life no spontaneous movement)





- Skin


Skin exam: Present: pallor, other (Cold to touch)





ED Medical Decision Making





- Medical Decision Making





Cardiac arrest: ACLS algorithm resuscitation continued emergency department, 2 

doses of epinephrine, 1 dose calcium 1 dose of sodium bicarbonate and 1 dose 

dextrose, patient given 2 shocks 1 200 J second 200 J biphasic energy for 

ventricular fibrillation.  Rhythm deteriorated to asystole.  Time of death 0351 

total time of resuscitation in the field and in the emergency department 40 

minutes.





Patient received chest compressions throughout resuscitation.





I performed the notification.  2 sons arrived to the emergency department.  

Patient had not been ill recently.  Recently cleared by [facility].  He had 

received treatment for esophageal cancer.


Critical care attestation.: 


If time is entered above; I have spent that time in minutes in the direct care 

of this critically ill patient, excluding procedure time.








ED Disposition


Clinical Impression: 


 Cardiac arrest





Disposition: 20 


Is pt being admited?: No


Does the pt Need Aspirin: No


Condition: Stable


Time of Disposition: 03:51